# Patient Record
Sex: MALE | Race: BLACK OR AFRICAN AMERICAN | NOT HISPANIC OR LATINO | Employment: OTHER | URBAN - METROPOLITAN AREA
[De-identification: names, ages, dates, MRNs, and addresses within clinical notes are randomized per-mention and may not be internally consistent; named-entity substitution may affect disease eponyms.]

---

## 2019-12-03 PROBLEM — G40.309 EPILEPSY SEIZURE, GENERALIZED, CONVULSIVE: Status: ACTIVE | Noted: 2019-12-03

## 2020-03-09 PROBLEM — M25.511 PAIN IN SHOULDER REGION, RIGHT: Status: ACTIVE | Noted: 2020-03-09

## 2020-06-26 PROBLEM — R07.9 CHEST PAIN, RULE OUT ACUTE MYOCARDIAL INFARCTION: Status: ACTIVE | Noted: 2020-06-26

## 2020-06-26 PROBLEM — J44.9 COPD (CHRONIC OBSTRUCTIVE PULMONARY DISEASE): Status: ACTIVE | Noted: 2020-06-26

## 2020-06-27 PROBLEM — R07.9 CHEST PAIN: Status: RESOLVED | Noted: 2020-06-26 | Resolved: 2020-06-27

## 2021-07-08 PROBLEM — R07.9 CHEST PAIN: Status: ACTIVE | Noted: 2021-07-08

## 2021-07-09 PROBLEM — M25.511 PAIN IN SHOULDER REGION, RIGHT: Status: RESOLVED | Noted: 2020-03-09 | Resolved: 2021-07-09

## 2021-08-17 PROBLEM — Z72.0 TOBACCO ABUSE: Status: ACTIVE | Noted: 2021-08-17

## 2022-01-05 ENCOUNTER — NURSE TRIAGE (OUTPATIENT)
Dept: ADMINISTRATIVE | Facility: CLINIC | Age: 52
End: 2022-01-05
Payer: MEDICAID

## 2022-01-05 DIAGNOSIS — U07.1 COVID-19 VIRUS DETECTED: ICD-10-CM

## 2022-01-05 NOTE — TELEPHONE ENCOUNTER
Patient is already planning to go to the ED for chest pain and a cut hand. Would like to know if enough time has passed to get his booster. Advised he could now get booster, Triage done per chest pain. Dispo ED, patient verb understanding and states he has a ride. No further questions or concerns at this time.     Reason for Disposition   Chest pain lasting longer than 5 minutes and occurred in last 3 days (72 hours) (Exception: feels exactly the same as previously diagnosed heartburn and has accompanying sour taste in mouth)    Additional Information   Negative: SEVERE difficulty breathing (e.g., struggling for each breath, speaks in single words)   Negative: Passed out (i.e., fainted, collapsed and was not responding)   Negative: Difficult to awaken or acting confused (e.g., disoriented, slurred speech)   Negative: Shock suspected (e.g., cold/pale/clammy skin, too weak to stand, low BP, rapid pulse)   Negative: Chest pain lasting longer than 5 minutes and ANY of the following:* Over 44 years old* Over 30 years old and at least one cardiac risk factor (e.g., diabetes mellitus, high blood pressure, high cholesterol, smoker, or strong family history of heart disease)* History of heart disease (i.e., angina, heart attack, heart failure, bypass surgery, takes nitroglycerin)* Pain is crushing, pressure-like, or heavy   Negative: Heart beating < 50 beats per minute OR > 140 beats per minute   Negative: Visible sweat on face or sweat dripping down face   Negative: Sounds like a life-threatening emergency to the triager   Negative: Followed an injury to chest   Negative: SEVERE chest pain   Negative: Pain also in shoulder(s) or arm(s) or jaw   Negative: Difficulty breathing   Negative: Cocaine use within last 3 days   Negative: Major surgery in the past month   Negative: Hip or leg fracture (broken bone) in past month (or had cast on leg or ankle in past month)   Negative: Illness requiring prolonged  bedrest in past month (e.g., immobilization, long hospital stay)   Negative: Long-distance travel in past month (e.g., car, bus, train, plane; with trip lasting 6 or more hours)   Negative: History of prior 'blood clot' in leg or lungs (i.e., deep vein thrombosis, pulmonary embolism)   Negative: History of inherited increased risk of blood clots (e.g., Factor 5 Leiden, Anti-thrombin 3, Protein C or Protein S deficiency, Prothrombin mutation)   Negative: Cancer treatment in the past two months (or has cancer now)   Negative: Heart beating irregularly or very rapidly    Protocols used: CHEST PAIN-A-OH

## 2022-04-24 PROBLEM — R56.9 SEIZURE: Status: ACTIVE | Noted: 2022-04-24

## 2022-04-24 PROBLEM — K64.9 HEMORRHOIDS: Status: ACTIVE | Noted: 2022-04-24

## 2022-10-19 PROBLEM — R07.9 CHEST PAIN, RULE OUT ACUTE MYOCARDIAL INFARCTION: Status: RESOLVED | Noted: 2020-06-26 | Resolved: 2022-10-19

## 2022-10-19 PROBLEM — F10.10 ALCOHOL ABUSE: Status: ACTIVE | Noted: 2022-10-19

## 2023-01-25 PROBLEM — G40.909 SEIZURE DISORDER: Status: ACTIVE | Noted: 2022-04-24

## 2023-06-25 PROBLEM — G40.919 BREAKTHROUGH SEIZURE: Status: ACTIVE | Noted: 2022-04-24

## 2024-02-14 ENCOUNTER — TELEPHONE (OUTPATIENT)
Dept: ADMINISTRATIVE | Facility: HOSPITAL | Age: 54
End: 2024-02-14
Payer: MEDICAID

## 2024-02-14 NOTE — TELEPHONE ENCOUNTER
Patient on list for overdue colonoscopy screening, call number on file, phone message number not in service